# Patient Record
(demographics unavailable — no encounter records)

---

## 2025-01-30 NOTE — DISCUSSION/SUMMARY
[FreeTextEntry1] : Symptomatic treatment of fever and/or pain  Stat strep test ordered Covid test done Flu test done Hydrate well Handwashing and infection control discussed Return to office if febrile > 48 hours or if symptoms get worse Go to ER if unable to come to the office or during after hours, parent encouraged to call service first before doing so. Will send to ED for further eval for left chest pain and cellulitis Mom will let ED know that he is also strep positive when giving antibiotics

## 2025-01-30 NOTE — PHYSICAL EXAM
[Tired appearing] : tired appearing [Clear Rhinorrhea] : clear rhinorrhea [Erythematous Oropharynx] : erythematous oropharynx [Symmetric Chest Wall] : symmetric chest wall [Tenderness With Palpation of Chest Wall] : tenderness with palpation of chest wall [NL] : moves all extremities x4, warm, well perfused x4 [Acute Distress] : no acute distress [Conjuctival Injection] : no conjunctival injection [Discharge] : no discharge [Erythema] : no erythema [Bulging] : not bulging [Vesicles] : no vesicles [Exudate] : no exudate [Ulcerative Lesions] : no ulcerative lesions [Palate petechiae] : palate without petechiae [Wheezing] : no wheezing [Rales] : no rales [Crackles] : no crackles [Tachypnea] : no tachypnea [Rhonchi] : no rhonchi [de-identified] : diffuse eczema, patch of erythema on left chest and left arm, no streaking but warm and tender to touch, no discharge

## 2025-01-30 NOTE — REVIEW OF SYSTEMS
[Fever] : fever [Chills] : chills [Malaise] : malaise [Nasal Discharge] : nasal discharge [Nasal Congestion] : nasal congestion [Sore Throat] : sore throat [Cough] : cough [Myalgia] : myalgia [Rash] : rash [Negative] : Genitourinary [Eye Discharge] : no eye discharge [Eye Redness] : no eye redness [Ear Pain] : no ear pain [Cyanosis] : no cyanosis [Tachypnea] : not tachypneic [Wheezing] : no wheezing [Vomiting] : no vomiting [Diarrhea] : no diarrhea [Swelling of Joint] : no swelling of joint [Redness of Joint] : no redness of joint

## 2025-01-30 NOTE — HISTORY OF PRESENT ILLNESS
[de-identified] : Headache, fever, and rash  [FreeTextEntry6] : Fever and chills x 2 days HA and body aches x 2 days Right side of chest hurt x 2 days and resolved, now left side of chest hurts to touch Eczema flaring up, red patch on left chest x 1 day Cough and runny nose  No ear pain No sore throat No wheezing or dyspnea Normal appetite, No vomiting, No diarrhea No smell or taste issues No sick contacts No Covid contacts or exposure No recent travel or contact with travelers

## 2025-07-18 NOTE — PHYSICAL EXAM
[TextEntry] : General: awake, alert, no acute distress, interactive, cooperative, appropriate for age Head: normocephalic, no signs injury Eyes: + red reflex bilaterally, EOMI, no purulent discharge, no conjunctival or scleral erythema Ears: tympanic membranes normal appearing bilaterally, no ear pit or tag Nose: no discharge Mouth: mucosa moist and pink, oropharynx without erythema Neck: supple, FROM Lungs: clear to auscultation bilaterally, no accessory muscle use Cardiac: normal S1 S2, regular rate and rhythm, no murmur appreciated Abdomen: soft, non tender, non distended, no hepatosplenomegaly  Chest: no gynecomastia Genitals: chanel 1 normal appearing male genitalia, testicles descended bilaterally Lymphatics: no abnormal lymph nodes palpated Back: no scoliosis noted Skin: diffuse papular and dry pathes on skin arms and legs

## 2025-07-18 NOTE — PLAN

## 2025-07-18 NOTE — HISTORY OF PRESENT ILLNESS
[Yes] : Patient goes to dentist yearly [Toothpaste] : Primary Fluoride Source: Toothpaste [No] : No cigarette smoke exposure [Appropriately restrained in motor vehicle] : appropriately restrained in motor vehicle [Supervised outdoor play] : supervised outdoor play [NO] : No [Exposure to tobacco] : no exposure to tobacco [Exposure to electronic nicotine delivery system] : No exposure to electronic nicotine delivery system [Exposure to illicit drugs] : no exposure to illicit drugs [FreeTextEntry1] : lives with parents in grade finished 4th  doing well in school, likes teacher, has friends, no bullying no problems in school identified, no ADHD concerns participates in band, and multiple sports  no history of injury and  patient is doing well - has no concerns or issues. appetite good, eats variety of foods, 3 meals a day and snacks, consumes fruits, vegetables, meat, dairy no sleep concerns, goes to dentist regularly, brushing teeth 1-2 x a day (tries 2 x a day) patient not having any fevers without a cause, pain that wakes them in the night, or night sweats. Urinating and stooling normally, no chest pain, palpitations or syncope with exercise. Parent(s) have no current concerns or issues